# Patient Record
Sex: FEMALE | URBAN - METROPOLITAN AREA
[De-identification: names, ages, dates, MRNs, and addresses within clinical notes are randomized per-mention and may not be internally consistent; named-entity substitution may affect disease eponyms.]

---

## 2017-07-05 ENCOUNTER — TRANSFERRED RECORDS (OUTPATIENT)
Dept: HEALTH INFORMATION MANAGEMENT | Facility: CLINIC | Age: 72
End: 2017-07-05

## 2017-07-24 ASSESSMENT — ENCOUNTER SYMPTOMS
HEARTBURN: 0
MUSCLE CRAMPS: 0
HYPERTENSION: 0
EYE IRRITATION: 0
LEG PAIN: 0
HOARSE VOICE: 0
TROUBLE SWALLOWING: 1
EXERCISE INTOLERANCE: 1
DIARRHEA: 0
SWOLLEN GLANDS: 0
TACHYCARDIA: 0
NECK MASS: 0
RECTAL PAIN: 0
PALPITATIONS: 0
CONSTIPATION: 0
NECK PAIN: 0
TASTE DISTURBANCE: 1
SINUS PAIN: 0
SMELL DISTURBANCE: 0
ARTHRALGIAS: 0
VOMITING: 0
BACK PAIN: 0
MYALGIAS: 0
EYE PAIN: 0
DECREASED APPETITE: 0
NIGHT SWEATS: 1
LEG SWELLING: 0
SYNCOPE: 0
ALTERED TEMPERATURE REGULATION: 1
BOWEL INCONTINENCE: 1
SINUS CONGESTION: 0
JOINT SWELLING: 0
BRUISES/BLEEDS EASILY: 0
STIFFNESS: 0
ORTHOPNEA: 0
SORE THROAT: 0
POLYPHAGIA: 0
WEIGHT LOSS: 1
FEVER: 0
NAUSEA: 0
WEIGHT GAIN: 0
MUSCLE WEAKNESS: 1
EYE WATERING: 0
HYPOTENSION: 0
LIGHT-HEADEDNESS: 0
DOUBLE VISION: 0
FATIGUE: 1
EYE REDNESS: 0
BLOATING: 0
ABDOMINAL PAIN: 0
SLEEP DISTURBANCES DUE TO BREATHING: 0
HALLUCINATIONS: 0
JAUNDICE: 0
CLAUDICATION: 0
INCREASED ENERGY: 0
CHILLS: 0
POLYDIPSIA: 0

## 2017-08-07 ENCOUNTER — RESEARCH ENCOUNTER (OUTPATIENT)
Dept: NEUROLOGY | Facility: CLINIC | Age: 72
End: 2017-08-07

## 2017-08-07 ENCOUNTER — OFFICE VISIT (OUTPATIENT)
Dept: NEUROLOGY | Facility: CLINIC | Age: 72
End: 2017-08-07

## 2017-08-07 ENCOUNTER — THERAPY VISIT (OUTPATIENT)
Dept: PHYSICAL THERAPY | Facility: CLINIC | Age: 72
End: 2017-08-07

## 2017-08-07 VITALS — HEART RATE: 80 BPM | DIASTOLIC BLOOD PRESSURE: 61 MMHG | HEIGHT: 68 IN | SYSTOLIC BLOOD PRESSURE: 154 MMHG

## 2017-08-07 DIAGNOSIS — G71.00 MUSCULAR DYSTROPHY (H): Primary | ICD-10-CM

## 2017-08-07 DIAGNOSIS — G71.3 MITOCHONDRIAL MYOPATHY: Primary | ICD-10-CM

## 2017-08-07 DIAGNOSIS — Z74.09 IMPAIRED MOBILITY AND ADLS: Primary | ICD-10-CM

## 2017-08-07 DIAGNOSIS — G71.00 MUSCULAR DYSTROPHY (H): ICD-10-CM

## 2017-08-07 DIAGNOSIS — M34.9 SYSTEMIC SCLEROSIS (H): ICD-10-CM

## 2017-08-07 DIAGNOSIS — Z78.9 IMPAIRED MOBILITY AND ADLS: Primary | ICD-10-CM

## 2017-08-07 DIAGNOSIS — G71.3 MITOCHONDRIAL MYOPATHY: ICD-10-CM

## 2017-08-07 DIAGNOSIS — R94.2 DIFFUSION CAPACITY OF LUNG (DL), DECREASED: ICD-10-CM

## 2017-08-07 DIAGNOSIS — K74.3 PRIMARY BILIARY CIRRHOSIS (H): ICD-10-CM

## 2017-08-07 ASSESSMENT — PAIN SCALES - GENERAL: PAINLEVEL: NO PAIN (0)

## 2017-08-07 NOTE — MR AVS SNAPSHOT
After Visit Summary   8/7/2017    Ayla Mayers    MRN: [id number]           Patient Information     Date Of Birth          1945        Visit Information        Provider Department      8/7/2017 1:00 PM Mandi Guzman PT [facility]        Care Instructions    Mitochondral Myopathy Exercise Guidelines    Muscle fibers may break down in an attempt to meet the energy requirements of exercise. Exercise intolerance is common.     Light aerobic activity can be beneficial for managing symptoms, including fatigue, mood and improve your overall health.    **Do not engage in aerobic exercise that causes hear rate to exceed 65% of HR max. (HR max= 220- age)     At age 72, you should stay below 137 bpm with activity    Light resistance exercise can help build muscle mass, 5-10 lb at most    **Warning signs to stop exerice: pain, muscle tightness or cramping, exhaustion, or dark (coca-cola colored) urine. Seek medical attention if darkened urine is noticed.    Follow any cardiac precautions outlined by your cardiologist, as applicable.     Walking, biking, pool activity (swimming, water walking) are likely best tolerated.     Aim to exercise ~3 times/week at low intensity. If noticing adverse symptoms: stop, but you recover well, after 10-15 minutes you could continue activity at a lower level.            Follow-ups after your visit        Future tests that were ordered for you today     Open Future Orders        Priority Expected Expires Ordered    FSHD: Laboratory Miscellaneous Order Routine  8/7/2018 8/7/2017    PHYSICAL THERAPY REFERRAL Routine 8/11/2017 8/7/2018 8/7/2017    PFT General Lab Testing Routine 8/7/2017 8/4/2018 8/4/2017            Who to contact     Please call your clinic at [phone] to:    Ask questions about your health    Make or cancel appointments    Discuss your medicines    Learn about your test results    Speak to your doctor   If you have  compliments or concerns about an experience at your clinic, or if you wish to file a complaint, please contact [facility] Patient Relations at [phone] or email us at [email or web address]         Additional Information About Your Visit        MyChart Information     [employer] gives you secure access to your electronic health record. If you see a primary care provider, you can also send messages to your care team and make appointments. If you have questions, please call your primary care clinic.  If you do not have a primary care provider, please call [phone] and they will assist you.      [employer] is an electronic gateway that provides easy, online access to your medical records. With [employer], you can request a clinic appointment, read your test results, renew a prescription or communicate with your care team.     To access your existing account, please contact your [facility] or call [phone] for assistance.        Care EveryWhere ID     This is your Care EveryWhere ID. This could be used by other organizations to access your [place] medical records  [id number]         Blood Pressure from Last 3 Encounters:   08/07/17 154/61   04/14/16 (!) 158/92   11/09/15 128/53    Weight from Last 3 Encounters:   04/14/16 56.2 kg (124 lb)   11/09/15 58.1 kg (128 lb)   11/24/14 60.3 kg (133 lb)              Today, you had the following     No orders found for display       Primary Care Provider Office Phone # Fax #    Jennifer Spear [phone]       [facility] [address] [place] [address]        Equal Access to Services     [place] : Hadii trish rea Sosavanna, waaxda luqadaha, qaybta kaalmada judit carvajal. So [facility] [phone].    ATENCIÓN: Si habla español, tiene a thomas disposición servicios gratuitos de asistencia lingüística. Llame al  [phone].    We comply with applicable federal civil rights laws and [place] laws. We do not discriminate on the basis of race, color, national origin, age, disability sex, sexual orientation or gender identity.            Thank you!     Thank you for choosing [facility]  for your care. Our goal is always to provide you with excellent care. Hearing back from our patients is one way we can continue to improve our services. Please take a few minutes to complete the written survey that you may receive in the mail after your visit with us. Thank you!             Your Updated Medication List - Protect others around you: Learn how to safely use, store and throw away your medicines at www.disposemymeds.org.          This list is accurate as of: 8/7/17  1:28 PM.  Always use your most recent med list.                   Brand Name Dispense Instructions for use Diagnosis    cholecalciferol 2000 UNITS tablet      Take 1 tablet by mouth daily        IRON SUPPLEMENT PO      Take 325 mg by mouth        levothyroxine 100 MCG tablet    SYNTHROID/LEVOTHROID     Take 1 tablet by mouth daily 100 mcg every other day, 112 the other days        OMEGA-3 FISH OIL PO      Take 1 g by mouth        OMEPRAZOLE PO      Take 20 mg by mouth every morning        RECLAST 5 MG/100ML Soln infusion   Generic drug:  zoledronic Acid      Inject 5 mg into the vein yearly    Muscular dystrophy (H), Mitochondrial myopathy       RESTASIS 0.05 % ophthalmic emulsion   Generic drug:  cycloSPORINE      1 drop 2 times daily.        spironolactone 25 MG tablet    ALDACTONE     25 mg daily        ursodiol 300 MG capsule    ACTIGALL     300 mg 3 times daily           negative No oral lesions; no gross abnormalities

## 2017-08-07 NOTE — PATIENT INSTRUCTIONS
If you have questions or concerns following today's appointment, please contact   Yolanda Mahmood at [phone].    For more urgent concerns, contact the neurology department triage line at [phone] option 3.    Follow up with Dr. Khan in 1 year.  Meet with our genetic counselor, Michelle Welsh, today.  PT today.     We now have a  available to help patients with psychosocial needs, supportive counseling, advanced care planning, and insurance and/or disability questions. You can reach BANDAR Garcia, LICSW at [phone].

## 2017-08-07 NOTE — LETTER
"8/7/2017       RE: Ayla Mayers  [address]  [place] [address]     Dear Colleague,    Thank you for referring your patient, Ayla Mayers, to the [facility] at [facility]. Please see a copy of my visit note below.    Neurology progress note    Subjective:  Ayla Mayers is a 72 year old female with a history of CREST syndrome, Sjogrens,  Primary biliary cirrhosis, and biopsy proven mitochondrial myopathy who presents for 1.5 year follow up of her left sided facial weakness and further evaluation as well as genetic testing. The patient has a complicated history. She reports that her symptoms that she attributes to her mitochondrial myopathy are stable, and that it is primarily her CREST symptoms that are currently causing her distress-specifically a recent diagnosis of primary biliary cirrhosis. Overall she hasn't noticed a significant deterioration of her strength since her last visit here. She reports that she is able to walk up to a mile \"easily\" before the onset of fatigue, or climb 1 flight of stairs/descend 1-2 flights. The patient has some difficulty rising from a chair, but as long as it has armrests on which she can brace herself it is rarely an issue. She denies any falls or difficulties with her balance. She denies difficulty swallowing solids and liquids as long as she is careful. She denies acute vision changes and recently had her strabismus surgically corrected.  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  Due to other active medical issues, she did not follow on testing for FSH dystrophy as we had previously requested, but she is amenable to having " "it performed at this time. She understands the potential implications of this testing for her family (2 adult sons).   Of note, she reports having an episode of \"heavy\" midline chest pain approximately 4 months ago, for which she was worked up in an outside hospital's ER for concerns of an acute coronary event or PE, both of which were negative. The pain resolved after ~1 month and was attributed to costochondritis. She had stress echocardiogram and EKG in 4/2017 which were both unremarkable.   Answers for HPI/ROS submitted by the patient on 7/24/2017     Past Medical History:  CREST syndrome  Sjogrens  Primary biliary cholangitis    Family History:   History reviewed. No pertinent family history.    Social History:   Social History     Social History     Marital status:      Spouse name: N/A     Number of children: N/A     Years of education: N/A     Occupational History     Not on file.     Social History Main Topics     Smoking status: Never Smoker     Smokeless tobacco: Never Used     Alcohol use Not on file     Drug use: Not on file     Sexual activity: Not on file     Other Topics Concern     Not on file     Social History Narrative     Objective:    Vitals: /61  Pulse 80  Ht 1.715 m (5' 7.5\")    GENERAL: Awake, alert, no apparent distress, oriented to person, place, and time.   MUSC: Kyphotic spine with atrophy of lumbar muscles    Neurological exam:  MS: Awake, Oriented to person/time/place. Fully alert with good concentration and cooperation.  LANGUAGE/SPEECH: No aphasia or dysarthria. Tone is normal. Able to repeat different phonic testings. Comprehension, calculation, naming, and repetition are all within normal limits. Normal affect.   CN:   III/IV/VI: Profound horizontal and vertical EOM restriction bilaterally, L>R. Left eye deviated inferolaterally when focusing right eye straight ahead. R pupil slightly sluggish but responsive to light.  V:  Absence of nasolabial fold and normal " markings of age on left, present on right.   VII: Facial muscles markedly asymmetric, with ptosis and significant atrophy and smoothing of the skin on the left.   VIII: Hearing intact to conversational voice.  IX/X: Palatal raise symmetric but weak  XI: Shoulder shrug weak but able to resist mild force bilaterally.  XII: Tongue midline; able to move it left and right without difficulties.  MOTOR:  Atrophy/Bulk: Significant muscle wasting of left facial muscles, and all four extremities  Fasciculation/Tremor: No fasciculation, tremor, or twitches.  Tone: Normal tone. Atrophy throughout all 4 extremities.    Strength:   4/5 neck extension, 2/5  Neck and trunk forward flexion,  strength 5/5 on right and 4/5 on left. Mild scapular winging on right. Positive Beevor sign.     Upper extremity   DELTOID  TRICEPS  BICEPS  WRIST   EXTENSION  FINGER   EXTENSION  FDI   RIGHT  4- 4 4 4 4- 4   LEFT  4- 3 4 4 4- 4     Lower extremity   HIP   FLEXION  KNEE   FLEXION  KNEE   EXTENSION  DORSIFLEXION   RIGHT   3- 5 5 4   LEFT  3 5 5 4       DEEP TENDON REFLEXES:    BICEPS  TRICEPS  BR  PATELLA  ACHILLES    RIGHT  +2  +2  +2  +2  +2    LEFT  +2  +2  +2  +2  +2    SENSORY:  Intact to light touch and pin prick throughout all four extremities  COORDINATION:  No dysdiadochokinesia   Finger tapping is normal  MOVEMENT: No resting or postural tremor  GAIT: Normal gait including walking on heels, toes, and tandem gait. Normal stride and no arm swing.  Romberg negative    Assessment & Plan:     1. Mitochondrial myopathy, biopsy proven 2. Rule out FSH muscular dystrophy  Patient returns for follow up with above issues. She should continue following with Rheumatology and Pulmonology, as well as Hepatology, for CREST, reduced DLCO and PBC respectively. We discussed treating her mitochondrial myopathy with a cocktail of nutrients including CoQ10, carnitine, creatine, lipoic acid, vitamin B complex, etc. I told her I do not have a very strong  opinion about it, as the data regarding its efficacy are mixed (and rather weak evidence). The patient did not appear very excited about those, for reasons including out-of-pocket cost, and we will defer those for now. She had recent PFTs, and cardiology evaluation, including EKG and echo, none of which demonstrated any cardiac or lung muscle involvement related to the mitochondrial disorder. She will be seen by our Physical therapist today, and [employer] representative. The asymmetric involvement of her facial muscles, the prominent lordosis, axial weakness and foot dorsiflexion weakness raise some suspicion for FSHD. This pattern of weakness is not entirely typical for MM. We will offer testing for the 4q35 deletion today; patient agrees. Follow up in 1 year or earlier if necessary.   Soy Keen  MS3    ATTENDING ADDENDUM: Patient seen and examined with MS3 Soy Keen who was acting as scribe on my behalf. Agree with the assessment and plan as above which I personally edited to reflect my own impression and findings.TT spent for patient care 25 minutes; more than half was counseling.    Suresh Khan MD   of Neurology

## 2017-08-07 NOTE — PROGRESS NOTES
"    OUTPATIENT PHYSICAL THERAPY CLINIC NOTE  Ayla Mayers  YOB: 1945  [id number]    Type of visit:         Evaluation     Date of service: 8/7/2017    Referring provider: Dr Khan    Others present at visit:  Spouse / significant other, Rickey    Medical diagnosis:   Muscular dystrophy (MD) - Mitochondrial myopathy    Date of diagnosis: 3/11/2005    Pertinent history of current problem (include personal factors and/or comorbidities that impact the plan of care): Gradually progressive weakness, proximal>distal. H/o Systemic sclerosis, Sjogrens, Sicca syndrome, L ptosis, Raynauds, liver cirrhosis with ascites, scleroderma, hypothyroidism.    Cardio-respiratory status:  Forced vital capacity: NT today (last 94% in 2015)     Height/Weight: 5'7\" / NT    Living environment:  Condo     Living environment barriers:   1 level once inside  15 stairs(1 railing present) - to underground parking. Also has elevator.      Current assistance/living environment:  Lives with  (Rickey)  Indep with IADLs  Not walking as far as I'd like to - limited by leg fatigue - if level can do 1-1.5miles, ~30 mins      Current mobility equipment:  None      Current ADL equipment:  Toilet safety frame  Walk in shower     Technology used: Computer and cell- ok    Patient concerns/goals: Elevator to ascend, stairs to go down with rail. No falls. No specific concerns. Notes she was really good about doing exercises from [facility] for a while, but then got out of routine, requesting review/progression as appropriate.     Evaluation   Interview completed.   Pain assessment: Pain denied   Range of motion: Shoulder AROM R 105*, L 115*    Manual muscle testing: Shoulder flexion 4-/5 R, 4+/5 L, abd 4-/5 R, 4/5 L, bicep 4+/5 R, 5/5 L, tricep 4-/5 B, wrist ext 4/5 R, 4+/5 L, wrist flex 4+/5 B,  reduced R, WNL L. Pt notes she's R hand dominant but does more things functionally with her L UE. Hip flexion 2/5 B, hip abd 4+/5 B, hip " add 5/5 B, knee ext/flex 5/5, ankle DF 5/5 B. Pt needed strong use of her UEs for sit>stand from standard height chair.    Gait: Pt amb without AD with shortened step length, adequate foot clearance, kyphotic and sway back posture noted.    Cognition: Intact    Fall Risk Screen:   Has the patient fallen 2 or more times in the last year? No      Has the patient fallen and had an injury in the past year? No       Timed Up and Go Score: 8.0 sec     25ft walk: 6.84 sec, 12 steps    Is the patient a fall risk? Yes, due to proximal weakness, department fall risk interventions implemented     Impairments:  Fatigue  Muscle atrophy  Balance  Range of motion     Treatment diagnosis:  Impaired mobility  Impaired activities of daily living    Clinical Presentation: Stable/Uncomplicated  Clinical Presentation Rationale: Very gradual progression of weakness, overall stable course  Clinical Decision Making (Complexity): Low complexity     Recommendations/Plan of care:  1 session evaluation & treatment.     Goals:   Target date: 8/7/17  Patient, family and/or caregiver will verbalize understanding of evaluation results and implications for functional performance.  Patient, family and/or caregiver will verbalize/demonstrate understanding of home program.    Educational assessment/barriers to learning:   No barriers noted     Treatment provided this date:   Therapeutic exercise, 20 minutes  -Reviewed technique and rationale for exercises provided by Sheryl in the past and potential additions to program to promote posture:  -Scapular retraction and chin tuck, performed in supine, 5 sec hold x5 reps, gradual progression toward 10 reps.   -Chest stretch, lying supine, hands behind head, let elbows open out and stretch front of shoulders/chest, hold 30-60 sec, 2-3 reps  -Posture at wall- stand with hips and shoulders touching wall, bring head back over body (might not quite touch), keep note/eyes level vs tipping. Bring elements from scap  squeeze and chin tuck to upright posture. Hold 10 sec x3 reps, gradually increase time toward 30 sec as tolerated  -Provided general guidelines for aerobic exercise in setting of Mitochondrial Myopathy. Discussed benefits of regular activity, but need for caution to avoid over-exertion which might lead to muscle breakdown. Provided guideline for working under 65% of HR max. Discussed ways to monitor HR, or use perceived exertion as guide. Listen to signs of fatigue and rest, if recovered can return to exercise at lighter workload. Pt voices understanding.     Response to treatment/recommendations: Voices understanding    Goal attainment:  All goals met     Risks and benefits of evaluation/treatment have been explained.  Patient, family and/or caregiver are in agreement with Plan of Care.     Evaluation time: 8  Treatment time: 20  Total contact time: 28    Signature:   Danica Guzman, PT, DPT  Physical Therapist  [facility] [address]  [place] [address]  [email or web address]  Office: [phone]  Pager: [phone]  Fax: [phone]   Date: 8/7/2017    Medicare G-code:  Mobility: Walking and Moving Around  Current Status , Goal , Discharge   1 session only, modifier the same for all G-codes.  CI: 1-19% impairment  Modifier determined by clinical judgment in conjunction with objective data and subjective report.        Certification: BCBS [place], Medicare  Onset date: 8/7/2017  Start of care date: 8/7/2017  Certification date from 8/7/2017 to 8/7/2017    I CERTIFY THE NEED FOR THESE SERVICES FURNISHED UNDER        THIS PLAN OF TREATMENT AND WHILE UNDER MY CARE     (Physician attestation of this document indicates review and certification of the therapy plan).

## 2017-08-07 NOTE — MR AVS SNAPSHOT
After Visit Summary   8/7/2017    Ayla Mayers    MRN: [id number]           Patient Information     Date Of Birth          1945        Visit Information        Provider Department      8/7/2017 11:50 AM Suresh Khan MD [facility] Neurology        Today's Diagnoses     Muscular dystrophy (H)    -  1      Care Instructions    If you have questions or concerns following today's appointment, please contact   Yolanda Mahmood at [phone].    For more urgent concerns, contact the neurology department triage line at [phone] option 3.    Follow up with Dr. Khan in 1 year.  Meet with our genetic counselor, Michelle Welsh, today.  PT today.     We now have a  available to help patients with psychosocial needs, supportive counseling, advanced care planning, and insurance and/or disability questions. You can reach BANDAR Garcia, [facility] at [phone].              Follow-ups after your visit        Additional Services     PHYSICAL THERAPY REFERRAL       PT Clinician to Evaluate and treat patient in [facility].                  Your next 10 appointments already scheduled     Aug 07, 2017  2:00 PM CDT   LAB with [facility] ([facility])    [address] [place] [address] [phone]           Patient must bring picture ID. Patient should be prepared to give a urine specimen  Please do not eat 10-12 hours before your appointment if you are coming in fasting for labs on lipids, cholesterol, or glucose (sugar). Pregnant women should follow their Care Team instructions. Water with medications is okay. Do not drink coffee or other fluids. If you have concerns about taking  your medications, please ask at office or if scheduling via [employer], send a message by clicking on Secure Messaging, Message Your Care Team.            Aug 06, 2018 11:50 AM CDT   (Arrive by 11:35 AM)   Return Muscular Dystrophy  with Suresh Khan MD   [facility] Neurology ([facility])    [address]  [address] [facility] [address] [phone]              Future tests that were ordered for you today     Open Future Orders        Priority Expected Expires Ordered    FSHD: Laboratory Miscellaneous Order Routine  8/7/2018 8/7/2017    PHYSICAL THERAPY REFERRAL Routine 8/11/2017 8/7/2018 8/7/2017    PFT General Lab Testing Routine 8/7/2017 8/4/2018 8/4/2017            Who to contact     Please call your clinic at [phone] to:    Ask questions about your health    Make or cancel appointments    Discuss your medicines    Learn about your test results    Speak to your doctor   If you have compliments or concerns about an experience at your clinic, or if you wish to file a complaint, please contact [facility] Patient Relations at [phone] or email us at [email or web address]         Additional Information About Your Visit        [employer] Information     [employer] gives you secure access to your electronic health record. If you see a primary care provider, you can also send messages to your care team and make appointments. If you have questions, please call your primary care clinic.  If you do not have a primary care provider, please call [phone] and they will assist you.      [employer] is an electronic gateway that provides easy, online access to your medical records. With [employer], you can request a clinic appointment, read your test results, renew a prescription or communicate with your care team.     To access your existing account, please contact your [facility] or call [phone] for assistance.        Care EveryWhere ID     This is your Care EveryWhere ID. This could be used by other organizations to access your [place] medical records  [id number]        Your Vitals Were     Pulse Height                80  "1.715 m (5' 7.5\")           Blood Pressure from Last 3 Encounters:   08/07/17 154/61   04/14/16 (!) 158/92   11/09/15 128/53    Weight from Last 3 Encounters:   04/14/16 56.2 kg (124 lb)   11/09/15 58.1 kg (128 lb)   11/24/14 60.3 kg (133 lb)               Primary Care Provider Office Phone # Fax #    Jennifer Spear [phone]       [facility] [address] [place] [address]        Equal Access to Services     [facility]: Hadii trish chapman hadasho Soomaali, waaxda luqadaha, qaybta kaalmada adeegyada, judit vasquez . So [facility] [phone].    ATENCIÓN: Si habla español, tiene a thomas disposición servicios gratuitos de asistencia lingüística. [facility] [phone].    We comply with applicable federal civil rights laws and [place] laws. We do not discriminate on the basis of race, color, national origin, age, disability sex, sexual orientation or gender identity.            Thank you!     Thank you for choosing [facility] NEUROLOGY  for your care. Our goal is always to provide you with excellent care. Hearing back from our patients is one way we can continue to improve our services. Please take a few minutes to complete the written survey that you may receive in the mail after your visit with us. Thank you!             Your Updated Medication List - Protect others around you: Learn how to safely use, store and throw away your medicines at www.disposemymeds.org.          This list is accurate as of: 8/7/17  1:35 PM.  Always use your most recent med list.                   Brand Name Dispense Instructions for use Diagnosis    cholecalciferol 2000 UNITS tablet      Take 1 tablet by mouth daily        IRON SUPPLEMENT PO      Take 325 mg by mouth        levothyroxine 100 MCG tablet    SYNTHROID/LEVOTHROID     Take 1 tablet by mouth daily 100 mcg every other day, 112 the other days        OMEGA-3 FISH OIL PO      Take 1 g by mouth        OMEPRAZOLE PO      " Take 20 mg by mouth every morning        RECLAST 5 MG/100ML Soln infusion   Generic drug:  zoledronic Acid      Inject 5 mg into the vein yearly    Muscular dystrophy (H), Mitochondrial myopathy       RESTASIS 0.05 % ophthalmic emulsion   Generic drug:  cycloSPORINE      1 drop 2 times daily.        spironolactone 25 MG tablet    ALDACTONE     25 mg daily        ursodiol 300 MG capsule    ACTIGALL     300 mg 3 times daily

## 2017-08-07 NOTE — PROGRESS NOTES
[employer] GENETIC COUNSELING CONSULT:  Ayla was seen [facility] for a genetic counseling consult at the request of  to discuss genetic testing options for facioscapulohumeral muscular dystrophy (FSHD). Ayla has progressive muscle weakness of an unknown etiology.    FAMILY HISTORY  A detailed family history was taken and scanned into Ayla s medical record.  Her family history is significant for the followin brother and 1 sister who are healthy    2  sons who are also healthy    There is no known family history of neuromuscular disease or genetic condition.    Ethnic background is [place], [place] and Irish.      GENETIC COUNSELIN. Facioscapulohumeral muscular dystrophy (FSHD) is characterized by muscle weakness in the facial, scapular and upper arm muscles and subsequent development of weakness in other muscles of the lower legs and torso.  The clinical symptoms of FSHD are highly variable, both between individuals and within families.  Symptoms typically present in the twenties, however the age of onset is also highly variable.  Ayla has the following symptoms  asymmetric involvement of her facial muscles.    2. FSHD is an autosomal dominant condition.  This means that an individual affected with FSHD has a 1 out of 2 (50%) chance, in each pregnancy; to pass the allele associated with FSHD on to their offspring. It is estimated that 10-30% of individuals with FSHD are a result a new mutation and have no family history of FSHD.     3. We discussed genetic testing for FSHD.  The gene or genes that are associated with FSHD have not yet been identified.  A deletion within chromosome 4 has been found to be associated with FSHD.  This deletion is found in 90- 95% of the individuals that have FSHD.  If negative, we will follow-up with probe A/B testing that detects some mutations missed by other methods.  The is a second gene SMCHD1 associated with FSHD that can be  evaluated depending on the results.     4. Due to the heterogeneous nature of this condition and the complexity of her condition there is a reasonable probability of detecting a genetic mutation using this test. Genetic confirmation has a significant likelihood of providing my patient and his/her family with an accurate diagnosis, thereby ensuring appropriate medical management. In many cases, a diagnosis can lead to a specific treatment or management strategy that dramatically changes the clinical outcome. A diagnosis can also help identify necessary medical referrals, screening for associated complications, and recurrence risk counseling. Once a diagnosis is made, the costs associated with further testing are likely to decrease.    5. All immediate questions were answered.  My contact information was provided for future questions.  Twenty minutes was spent with the patient and more than 50% of the time was spent in counseling and coordination of care.     PLAN:   1. Ayla had her blood drawn on August 7, 2017 for genetic testing for FSHD  to be performed at the [facility].  2. Ayla will be contacted with results.    Michelle Welsh MS  Certified Genetic Counselor  Phone: [phone]

## 2017-08-07 NOTE — PATIENT INSTRUCTIONS
Mitochondral Myopathy Exercise Guidelines    Muscle fibers may break down in an attempt to meet the energy requirements of exercise. Exercise intolerance is common.     Light aerobic activity can be beneficial for managing symptoms, including fatigue, mood and improve your overall health.    **Do not engage in aerobic exercise that causes hear rate to exceed 65% of HR max. (HR max= 220- age)     At age 72, you should stay below 137 bpm with activity    Light resistance exercise can help build muscle mass, 5-10 lb at most    **Warning signs to stop exerice: pain, muscle tightness or cramping, exhaustion, or dark (coca-cola colored) urine. Seek medical attention if darkened urine is noticed.    Follow any cardiac precautions outlined by your cardiologist, as applicable.     Walking, biking, pool activity (swimming, water walking) are likely best tolerated.     Aim to exercise ~3 times/week at low intensity. If noticing adverse symptoms: stop, but you recover well, after 10-15 minutes you could continue activity at a lower level.

## 2017-08-07 NOTE — PROGRESS NOTES
[facility] Neuromuscular Registry    IRB # [id number]  PI: Enoch Suresh MD, PhD  : Miroslava Potts    Patient was approached for possible participation for the above study. The current approved IRB consent form was discussed and explained to the patient.  It was discussed that involvement with the study is voluntary and refusal to participate would not involve penalty or decrease benefits at which the patient is entitled, and the subject may discontinue his/her involvement at any time without penalty or loss in benefits. We also discussed that this study does not have follow up visits or procedures. Patient was informed that an additional contact might occur if data needed was not found in patient s medical record. The patient was given time to review and ask any questions about the consent. Patient was shown contact information for PI and study staff in consent for future questions. Patient verbalized understanding of consent and study by restating the purpose, procedures, duration, risk, confidentiality of PHI, and voluntarily participation. Patient printed, signed and dated the consent and HIPAA form prior to study involvement. A copy was given to the patient for their records.     Subject Consent/HIPAA : SIGNED ON 8.7.2017

## 2017-08-07 NOTE — LETTER
2017       RE: Ayla Mayers  [address]  [place] [address]     Dear Colleague,    Thank you for referring your patient, Ayla Mayers, to the [facility] NEUROLOGY at [facility]. Please see a copy of my visit note below.    [facility] GENETIC COUNSELING CONSULT:  Ayla was seen [facility] for a genetic counseling consult at the request of  to discuss genetic testing options for facioscapulohumeral muscular dystrophy (FSHD). Ayla has progressive muscle weakness of an unknown etiology.    FAMILY HISTORY  A detailed family history was taken and scanned into Ayla s medical record.  Her family history is significant for the followin brother and 1 sister who are healthy    2  sons who are also healthy    There is no known family history of neuromuscular disease or genetic condition.    Ethnic background is Telugu, Urdu [place] and [place].      GENETIC COUNSELIN. Facioscapulohumeral muscular dystrophy (FSHD) is characterized by muscle weakness in the facial, scapular and upper arm muscles and subsequent development of weakness in other muscles of the lower legs and torso.  The clinical symptoms of FSHD are highly variable, both between individuals and within families.  Symptoms typically present in the twenties, however the age of onset is also highly variable.  Ayla has the following symptoms  asymmetric involvement of her facial muscles.    2. FSHD is an autosomal dominant condition.  This means that an individual affected with FSHD has a 1 out of 2 (50%) chance, in each pregnancy; to pass the allele associated with FSHD on to their offspring. It is estimated that 10-30% of individuals with FSHD are a result a new mutation and have no family history of FSHD.     3. We discussed genetic testing for FSHD.  The gene or genes that are associated with FSHD have not yet been identified.  A deletion within  chromosome 4 has been found to be associated with FSHD.  This deletion is found in 90- 95% of the individuals that have FSHD.  If negative, we will follow-up with probe A/B testing that detects some mutations missed by other methods.  The is a second gene SMCHD1 associated with FSHD that can be evaluated depending on the results.     4. Due to the heterogeneous nature of this condition and the complexity of her condition there is a reasonable probability of detecting a genetic mutation using this test. Genetic confirmation has a significant likelihood of providing my patient and his/her family with an accurate diagnosis, thereby ensuring appropriate medical management. In many cases, a diagnosis can lead to a specific treatment or management strategy that dramatically changes the clinical outcome. A diagnosis can also help identify necessary medical referrals, screening for associated complications, and recurrence risk counseling. Once a diagnosis is made, the costs associated with further testing are likely to decrease.    5. All immediate questions were answered.  My contact information was provided for future questions.  Twenty minutes was spent with the patient and more than 50% of the time was spent in counseling and coordination of care.     PLAN:   1. Ayla had her blood drawn on August 7, 2017 for genetic testing for FSHD  to be performed at the [facility].  2. Ayla will be contacted with results.    Michelle Welsh MS  Certified Genetic Counselor  Phone: [phone]

## 2017-08-07 NOTE — PROGRESS NOTES
"Neurology progress note    Subjective:  Ayla Mayers is a 72 year old female with a history of CREST syndrome, Sjogrens,  Primary biliary cirrhosis, and biopsy proven mitochondrial myopathy who presents for 1.5 year follow up of her left sided facial weakness and further evaluation as well as genetic testing. The patient has a complicated history. She reports that her symptoms that she attributes to her mitochondrial myopathy are stable, and that it is primarily her CREST symptoms that are currently causing her distress-specifically a recent diagnosis of primary biliary cirrhosis. Overall she hasn't noticed a significant deterioration of her strength since her last visit here. She reports that she is able to walk up to a mile \"easily\" before the onset of fatigue, or climb 1 flight of stairs/descend 1-2 flights. The patient has some difficulty rising from a chair, but as long as it has armrests on which she can brace herself it is rarely an issue. She denies any falls or difficulties with her balance. She denies difficulty swallowing solids and liquids as long as she is careful. She denies acute vision changes and recently had her strabismus surgically corrected.  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  Due to other active medical issues, she did not follow on testing for FSH dystrophy as we had previously requested, but she is amenable to having it performed at this time. She understands the potential implications of this testing for her family (2 adult sons).   Of note, she reports having an episode of \"heavy\" midline chest pain approximately 4 months ago, for which she was worked up in an outside hospital's ER for concerns of an acute " coronary event or PE, both of which were negative. The pain resolved after ~1 month and was attributed to costochondritis. She had stress echocardiogram and EKG in 4/2017 which were both unremarkable.     Answers for HPI/ROS submitted by the patient on 7/24/2017   General Symptoms: Yes  Skin Symptoms: No  HENT Symptoms: Yes  EYE SYMPTOMS: Yes  HEART SYMPTOMS: Yes  LUNG SYMPTOMS: No  INTESTINAL SYMPTOMS: Yes  URINARY SYMPTOMS: No  GYNECOLOGIC SYMPTOMS: No  BREAST SYMPTOMS: No  SKELETAL SYMPTOMS: Yes  BLOOD SYMPTOMS: Yes  NERVOUS SYSTEM SYMPTOMS: No  MENTAL HEALTH SYMPTOMS: No  Fever: No  Loss of appetite: No  Weight loss: Yes  Weight gain: No  Fatigue: Yes  Night sweats: Yes  Chills: No  Increased stress: No  Excessive hunger: No  Excessive thirst: No  Feeling hot or cold when others believe the temperature is normal: Yes  Post-operative complications: No  Surgical site pain: No  Hallucinations: No  Change in or Loss of Energy: No  Hyperactivity: No  Confusion: No  Ear pain: No  Ear discharge: No  Hearing loss: No  Tinnitus: No  Nosebleeds: No  Congestion: No  Sinus pain: No  Trouble swallowing: Yes   Voice hoarseness: No  Mouth sores: No  Sore throat: No  Tooth pain: No  Gum tenderness: No  Bleeding gums: No  Change in taste: Yes  Change in sense of smell: No  Dry mouth: Yes  Hearing aid used: No  Neck lump: No  Eye pain: No  Vision loss: No  Dry eyes: Yes  Watery eyes: No  Eye bulging: No  Double vision: No  Flashing of lights: No  Spots: No  Floaters: No  Redness: No  Crossed eyes: No  Tunnel Vision: No  Yellowing of eyes: No  Eye irritation: No  Chest pain or pressure: No  Fast or irregular heartbeat: No  Pain in legs with walking: No  Swelling in feet or ankles: No  Trouble breathing while lying down: No  Fingers or Toes appear blue: No  High blood pressure: No  Low blood pressure: No  Fainting: No  Murmurs: No  Chest pain on exertion: No  Chest pain at rest: No  Cramping pain in leg during exercise:  "No  Pacemaker: No  Varicose veins: No  Fast heart beat: No  Wake up at night with shortness of breath: No  Heart flutters: No  Light-headedness: No  Exercise intolerance: Yes  Heart burn or indigestion: No  Nausea: No  Vomiting: No  Abdominal pain: No  Bloating: No  Constipation: No  Diarrhea: No  Black stools: No  Rectal or Anal pain: No  Fecal incontinence: Yes  Yellowing of skin or eyes: No  Vomit with blood: No  Change in stools: No  Hemorrhoids: No  Back pain: No  Muscle aches: No  Neck pain: No  Swollen joints: No  Joint pain: No  Bone pain: No  Muscle cramps: No  Muscle weakness: Yes  Joint stiffness: No  Bone fracture: No  Anemia: Yes  Swollen glands: No  Easy bleeding or bruising: No    Past Medical History:  CREST syndrome  Sjogrens  Primary biliary cholangitis      Family History:   History reviewed. No pertinent family history.    Social History:   Social History     Social History     Marital status:      Spouse name: N/A     Number of children: N/A     Years of education: N/A     Occupational History     Not on file.     Social History Main Topics     Smoking status: Never Smoker     Smokeless tobacco: Never Used     Alcohol use Not on file     Drug use: Not on file     Sexual activity: Not on file     Other Topics Concern     Not on file     Social History Narrative         Objective:    Vitals: /61  Pulse 80  Ht 1.715 m (5' 7.5\")    GENERAL: Awake, alert, no apparent distress, oriented to person, place, and time.   MUSC: Kyphotic spine with atrophy of lumbar muscles    Neurological exam:  MS: Awake, Oriented to person/time/place. Fully alert with good concentration and cooperation.  LANGUAGE/SPEECH: No aphasia or dysarthria. Tone is normal. Able to repeat different phonic testings. Comprehension, calculation, naming, and repetition are all within normal limits. Normal affect.   CN:   III/IV/VI: Profound horizontal and vertical EOM restriction bilaterally, L>R. Left eye deviated " inferolaterally when focusing right eye straight ahead. R pupil slightly sluggish but responsive to light.  V:  Absence of nasolabial fold and normal markings of age on left, present on right.   VII: Facial muscles markedly asymmetric, with ptosis and significant atrophy and smoothing of the skin on the left.   VIII: Hearing intact to conversational voice.  IX/X: Palatal raise symmetric but weak  XI: Shoulder shrug weak but able to resist mild force bilaterally.  XII: Tongue midline; able to move it left and right without difficulties.  MOTOR:  Atrophy/Bulk: Significant muscle wasting of left facial muscles, and all four extremities  Fasciculation/Tremor: No fasciculation, tremor, or twitches.  Tone: Normal tone. Atrophy throughout all 4 extremities.    Strength:   4/5 neck extension, 2/5  Neck and trunk forward flexion,  strength 5/5 on right and 4/5 on left. Mild scapular winging on right. Positive Beevor sign.     Upper extremity   DELTOID  TRICEPS  BICEPS  WRIST   EXTENSION  FINGER   EXTENSION  FDI   RIGHT  4- 4 4 4 4- 4   LEFT  4- 3 4 4 4- 4     Lower extremity   HIP   FLEXION  KNEE   FLEXION  KNEE   EXTENSION  DORSIFLEXION   RIGHT   3- 5 5 4   LEFT  3 5 5 4       DEEP TENDON REFLEXES:    BICEPS  TRICEPS  BR  PATELLA  ACHILLES    RIGHT  +2  +2  +2  +2  +2    LEFT  +2  +2  +2  +2  +2    SENSORY:  Intact to light touch and pin prick throughout all four extremities  COORDINATION:  No dysdiadochokinesia   Finger tapping is normal  MOVEMENT: No resting or postural tremor  GAIT: Normal gait including walking on heels, toes, and tandem gait. Normal stride and no arm swing.  Romberg negative    Assessment & Plan:     1. Mitochondrial myopathy, biopsy proven 2. Rule out FSH muscular dystrophy    Patient returns for follow up with above issues. She should continue following with Rheumatology and Pulmonology, as well as Hepatology, for CREST, reduced DLCO and PBC respectively. We discussed treating her mitochondrial  myopathy with a cocktail of nutrients including CoQ10, carnitine, creatine, lipoic acid, vitamin B complex, etc. I told her I do not have a very strong opinion about it, as the data regarding its efficacy are mixed (and rather weak evidence). The patient did not appear very excited about those, for reasons including out-of-pocket cost, and we will defer those for now. She had recent PFTs, and cardiology evaluation, including EKG and echo, none of which demonstrated any cardiac or lung muscle involvement related to the mitochondrial disorder. She will be seen by our Physical therapist today, and [employer] representative. The asymmetric involvement of her facial muscles, the prominent lordosis, axial weakness and foot dorsiflexion weakness raise some suspicion for FSHD. This pattern of weakness is not entirely typical for MM. We will offer testing for the 4q35 deletion today; patient agrees. Follow up in 1 year or earlier if necessary.   Soy Keen  MS3    ATTENDING ADDENDUM: Patient seen and examined with MS3 Soy Keen who was acting as scribe on my behalf. Agree with the assessment and plan as above which I personally edited to reflect my own impression and findings.TT spent for patient care 25 minutes; more than half was counseling.    Suresh Khan MD   of Neurology

## 2017-08-07 NOTE — NURSING NOTE
Chief Complaint   Patient presents with     RECHECK     MUscular Dystrophy    Avni White, DEEPALI

## 2017-08-07 NOTE — MR AVS SNAPSHOT
After Visit Summary   8/7/2017    Ayla Mayers    MRN: [id number]           Patient Information     Date Of Birth          1945        Visit Information        Provider Department      8/7/2017 11:50 AM Michelle Welsh [facility] Neurology        Today's Diagnoses     Muscular dystrophy (H)    -  1       Follow-ups after your visit        Your next 10 appointments already scheduled     Aug 06, 2018 11:50 AM CDT   (Arrive by 11:35 AM)   Return Muscular Dystrophy with Suresh Khan MD   [facility] Neurology ([facility])    [address] [place] [id number] [phone]              Future tests that were ordered for you today     Open Future Orders        Priority Expected Expires Ordered    PHYSICAL THERAPY REFERRAL Routine 8/11/2017 8/7/2018 8/7/2017    PFT General Lab Testing Routine 8/7/2017 8/4/2018 8/4/2017            Who to contact     Please call your clinic at [phone] to:    Ask questions about your health    Make or cancel appointments    Discuss your medicines    Learn about your test results    Speak to your doctor   If you have compliments or concerns about an experience at your clinic, or if you wish to file a complaint, please contact [facility] Patient Relations at [phone] or email us at [email or web address]         Additional Information About Your Visit        MyChart Information     [employer] gives you secure access to your electronic health record. If you see a primary care provider, you can also send messages to your care team and make appointments. If you have questions, please call your primary care clinic.  If you do not have a primary care provider, please call [phone] and they will assist you.      [employer] is an electronic gateway that provides easy, online access to your medical records. With [employer], you can request a clinic appointment, read your  test results, renew a prescription or communicate with your care team.     To access your existing account, please contact your [facility] or call [phone] for assistance.        Care EveryWhere ID     This is your Care EveryWhere ID. This could be used by other organizations to access your [facility] medical records  [id number]         Blood Pressure from Last 3 Encounters:   08/07/17 154/61   04/14/16 (!) 158/92   11/09/15 128/53    Weight from Last 3 Encounters:   04/14/16 56.2 kg (124 lb)   11/09/15 58.1 kg (128 lb)   11/24/14 60.3 kg (133 lb)              Today, you had the following     No orders found for display       Primary Care Provider Office Phone # Fax #    Jennifer Spear [phone]       [facility] [address] [place] [address]        Equal Access to Services     [place]SE [place] : Hadii aad ku hadasho Soomaali, waaxda luqadaha, qaybta kaalmada adeegyada, waxay idiin hayphillipn wanda vasquez . So [facility] [phone].    ATENCIÓN: Si habla español, tiene a thomas disposición servicios gratuitos de asistencia lingüística. Llame al [phone].    We comply with applicable federal civil rights laws and [place] laws. We do not discriminate on the basis of race, color, national origin, age, disability sex, sexual orientation or gender identity.            Thank you!     Thank you for choosing [facility] NEUROLOGY  for your care. Our goal is always to provide you with excellent care. Hearing back from our patients is one way we can continue to improve our services. Please take a few minutes to complete the written survey that you may receive in the mail after your visit with us. Thank you!             Your Updated Medication List - Protect others around you: Learn how to safely use, store and throw away your medicines at www.disposemymeds.org.          This list is accurate as of: 8/7/17  5:50 PM.  Always use your most recent med  list.                   Brand Name Dispense Instructions for use Diagnosis    cholecalciferol 2000 UNITS tablet      Take 1 tablet by mouth daily        IRON SUPPLEMENT PO      Take 325 mg by mouth        levothyroxine 100 MCG tablet    SYNTHROID/LEVOTHROID     Take 1 tablet by mouth daily 100 mcg every other day, 112 the other days        OMEGA-3 FISH OIL PO      Take 1 g by mouth        OMEPRAZOLE PO      Take 20 mg by mouth every morning        RECLAST 5 MG/100ML Soln infusion   Generic drug:  zoledronic Acid      Inject 5 mg into the vein yearly    Muscular dystrophy (H), Mitochondrial myopathy       RESTASIS 0.05 % ophthalmic emulsion   Generic drug:  cycloSPORINE      1 drop 2 times daily.        spironolactone 25 MG tablet    ALDACTONE     25 mg daily        ursodiol 300 MG capsule    ACTIGALL     300 mg 3 times daily

## 2017-09-19 LAB — LAB SCANNED RESULT: NORMAL

## 2017-09-20 ENCOUNTER — TELEPHONE (OUTPATIENT)
Dept: NEUROLOGY | Facility: CLINIC | Age: 72
End: 2017-09-20

## 2017-09-20 NOTE — TELEPHONE ENCOUNTER
Left message to contact me regarding results.    Michelle Welsh MS, [facility]  Genetic Counselor  Phone: [phone]

## 2017-09-20 NOTE — LETTER
September 20, 2017       TO: Ayla Mayers  [address]  [place] [address]       DearMs.Riana,    I am writing to follow-up on your genetic test results for facioscapulohumeral muscular dystrophy (FSHD), which was done because your clinical symptoms were not consistent with mitochondrial myopathy.  As you know you had testing for FSHD .  Your results were NEGATIVE, meaning you did NOT inherit the chromosome 4 deletion that causes FSHD, likewise they did not findings consistent with FSHD.  This means we still do not know the cause of your symptoms.  A copy of your results are enclosed in this letter.      It has been a pleasure being involved in your care.   If myself or the clinic staff can be helpful to you in anyway please contact us.    Sincerely,  Michelle Welsh MS, [facility]  Genetic Counselor  Phone: [phone]      Resulted Orders   Facioscapulohomeral Dystrophy (FSHD)   Result Value Ref Range    Lab Scanned Result FACIOSCAPULO DYSTROPHY-Scanned

## 2018-05-24 ENCOUNTER — TRANSFERRED RECORDS (OUTPATIENT)
Dept: HEALTH INFORMATION MANAGEMENT | Facility: CLINIC | Age: 73
End: 2018-05-24

## 2018-06-12 ENCOUNTER — TRANSFERRED RECORDS (OUTPATIENT)
Dept: HEALTH INFORMATION MANAGEMENT | Facility: CLINIC | Age: 73
End: 2018-06-12

## 2018-07-27 ENCOUNTER — TRANSFERRED RECORDS (OUTPATIENT)
Dept: HEALTH INFORMATION MANAGEMENT | Facility: CLINIC | Age: 73
End: 2018-07-27

## 2018-08-05 ASSESSMENT — ENCOUNTER SYMPTOMS
HEADACHES: 0
NIGHT SWEATS: 0
CONSTIPATION: 0
NECK PAIN: 0
VOMITING: 0
ABDOMINAL PAIN: 0
HEARTBURN: 0
BLOATING: 0
MYALGIAS: 0
NAUSEA: 0
POLYDIPSIA: 0
BACK PAIN: 0
DECREASED APPETITE: 0
MEMORY LOSS: 0
MUSCLE WEAKNESS: 1
HALLUCINATIONS: 0
DISTURBANCES IN COORDINATION: 0
RECTAL PAIN: 0
EYE IRRITATION: 0
TASTE DISTURBANCE: 0
STIFFNESS: 0
SINUS PAIN: 0
ARTHRALGIAS: 0
INCREASED ENERGY: 1
SEIZURES: 0
LOSS OF CONSCIOUSNESS: 0
SINUS CONGESTION: 0
SORE THROAT: 0
WEIGHT GAIN: 0
WEAKNESS: 1
BLOOD IN STOOL: 0
DIARRHEA: 0
HOARSE VOICE: 0
TINGLING: 0
FEVER: 0
WEIGHT LOSS: 1
EYE PAIN: 1
TREMORS: 0
DIZZINESS: 0
POLYPHAGIA: 0
JAUNDICE: 0
NUMBNESS: 0
PARALYSIS: 0
BOWEL INCONTINENCE: 1
TROUBLE SWALLOWING: 0
DOUBLE VISION: 0
FATIGUE: 1
ALTERED TEMPERATURE REGULATION: 1
MUSCLE CRAMPS: 0
SPEECH CHANGE: 0
CHILLS: 0
EYE REDNESS: 0
EYE WATERING: 0
NECK MASS: 0
JOINT SWELLING: 0
SMELL DISTURBANCE: 0

## 2018-08-06 ENCOUNTER — OFFICE VISIT (OUTPATIENT)
Dept: NEUROLOGY | Facility: CLINIC | Age: 73
End: 2018-08-06
Payer: COMMERCIAL

## 2018-08-06 ENCOUNTER — CARE COORDINATION (OUTPATIENT)
Dept: CARE COORDINATION | Facility: CLINIC | Age: 73
End: 2018-08-06

## 2018-08-06 VITALS
HEIGHT: 68 IN | SYSTOLIC BLOOD PRESSURE: 127 MMHG | HEART RATE: 86 BPM | BODY MASS INDEX: 17.85 KG/M2 | WEIGHT: 117.8 LBS | DIASTOLIC BLOOD PRESSURE: 55 MMHG

## 2018-08-06 DIAGNOSIS — G71.3 MITOCHONDRIAL MYOPATHY: Primary | ICD-10-CM

## 2018-08-06 ASSESSMENT — PAIN SCALES - GENERAL: PAINLEVEL: NO PAIN (0)

## 2018-08-06 NOTE — PROGRESS NOTES
DEPARTMENT OF NEUROLOGY    Patient Name:  Ayla Mayers  MRN:  [id number]    :  1945  Date of Clinic Visit:  2018  Primary Care Provider:  Jennifer Spear        HPI: Ms. Mayers is a 72 year old female with history of mitochondrial myopathy, scleroderma, Raynaud's, primary biliary sclerosis, and Sicca syndrome who follows in neurology clinic for mitochondrial myopathy. Work up has included muscle biopsy in  that was consistent with mitochondrial myopathy. In addition she has complex presentation with clinical signs atypical for mitochondrial myopathy including asymmetric weakness, facial weakness, and scapular winging. Given concern for possible facioscapulohumeral dystrophy gene testing was performed at last visit but was negative for FSH.     Please see notes from encounter on 17 for more information from this patient's last clinic visit.        INTERVAL HISTORY: She reports that overall her weakness has slowly progressed over the last year. She states she particularly has difficulty with walking up stairs or going up an incline. She also reports neck weakness. She can walk a mile before getting fatigued. She denies falls, imbalance, and dysphagia.     Answers for HPI/ROS submitted by the patient on 2018   General Symptoms: Yes  Skin Symptoms: No  HENT Symptoms: Yes  EYE SYMPTOMS: Yes  HEART SYMPTOMS: No  LUNG SYMPTOMS: No  INTESTINAL SYMPTOMS: Yes  URINARY SYMPTOMS: No  GYNECOLOGIC SYMPTOMS: No  BREAST SYMPTOMS: No  SKELETAL SYMPTOMS: Yes  BLOOD SYMPTOMS: No  NERVOUS SYSTEM SYMPTOMS: Yes  MENTAL HEALTH SYMPTOMS: No  Fever: No  Loss of appetite: No  Weight loss: Yes  Weight gain: No  Fatigue: Yes  Night sweats: No  Chills: No  Increased stress: No  Excessive hunger: No  Excessive thirst: No  Feeling hot or cold when others believe the temperature is normal: Yes  Loss of height: No  Post-operative complications: No  Surgical site pain: No  Hallucinations: No  Change in or Loss  "of Energy: Yes  Hyperactivity: No  Confusion: No  Ear pain: No  Ear discharge: No  Hearing loss: No  Tinnitus: No  Nosebleeds: No  Congestion: No  Sinus pain: No  Trouble swallowing: No   Voice hoarseness: No  Mouth sores: Yes  Sore throat: No  Tooth pain: No  Gum tenderness: Yes  Bleeding gums: No  Change in taste: No  Change in sense of smell: No  Dry mouth: Yes  Hearing aid used: No  Neck lump: No  Eye pain: Yes  Vision loss: No  Dry eyes: Yes  Watery eyes: No  Eye bulging: No  Double vision: No  Flashing of lights: No  Spots: No  Floaters: No  Redness: No  Crossed eyes: No  Tunnel Vision: No  Yellowing of eyes: No  Eye irritation: No  Heart burn or indigestion: No  Nausea: No  Vomiting: No  Abdominal pain: No  Bloating: No  Constipation: No  Diarrhea: No  Blood in stool: No  Black stools: No  Rectal or Anal pain: No  Fecal incontinence: Yes  Yellowing of skin or eyes: No  Vomit with blood: No  Change in stools: No  Back pain: No  Muscle aches: No  Neck pain: No  Swollen joints: No  Joint pain: No  Bone pain: No  Muscle cramps: No  Muscle weakness: Yes  Joint stiffness: No  Bone fracture: No  Trouble with coordination: No  Dizziness or trouble with balance: No  Fainting or black-out spells: No  Memory loss: No  Headache: No  Seizures: No  Speech problems: No  Tingling: No  Tremor: No  Weakness: Yes  Difficulty walking: No  Paralysis: No  Numbness: No      Vital signs:                         Estimated body mass index is 19.13 kg/(m^2) as calculated from the following:    Height as of 4/14/16: 1.715 m (5' 7.5\").    Weight as of 4/14/16: 56.2 kg (124 lb).    Examination:  Mental Status: Fully alert, attentive. Speech fluent without errors.   Cranial Nerves: Visual fields intact. PERRL. L ptosis. Dysconjugate gaze at baseline - L eye deviated latearlly; extraocular movements are abnormal - L eye with inability to adduct and does not fully abduct. She has a left facial droop. Hearing intact to conversation. Palate " elevation symmetric, uvula midline. No dysarthria. Tongue protrusion midline.  Motor: No abnormal movements, normal tone. Neck flexion 3/5.     --RUE: 4/5 strength in deltoid, biceps, triceps, wrist extension; 3/5 finger extensors    --LUE: 4-/5 strength in deltoid, biceps, triceps, wrist extension; 3/5 finger extensors    --RLE: 3/5 hip flexors; 4+/5 knee flexors/extensors, dorsiflexion    --LLE: 4-/5 hip flexors; 4+/5 knee flexors/extensors, dorsiflexion  Reflexes: 2+ and symmetric at patella, achilles, biceps, brachioradialis  Sensory: Intact/symmetric to light touch throughout upper and lower extremities.   Coordination: FNF intact without ataxia or dysmetria.   Station/Gait: Unable to stand without using arm rests. Normal gait. Difficulty with toe and heel walking.       IINVESTIGATIONS:  All available and relevant labs, imaging, and other procedures were reviewed with the patient at this visit. Those of particular significance are listed below:      PFTs (6/12/18):  TEST  ACTUAL PRED  % PRED  FVC (L)  2.70  3.17  85  FEV1 (L) 1.93  2.47  77  FEV1/FVC (%) 71  75  95      IMPRESSION/RECOMMENDATIONS:     #Mitochondrial myopathy:  Ms. Mayers is followed in neurology clinic for mitochondrial myopathy. Exam remains stable and she does not have any new complaints today. Her presentation is atypical for mitochondrial myopathy in that she has asymmetric involvement and particularly in the face. She has been evaluated for facioscapulohumeral dystrophy but this genetic test was negative. PFTs were completed recently and are stable. Echocardiogram and EKG were also completed recently and again were stable [facility] Recommend that she continues to have echo and PFTs yearly. There is currently no medications for mitochondrial myopathy. We did discuss vitamin supplementation including with CoQ10, creatine etc. The benefit of those in mitochondrial myopathy has not been well established, but she was  advised that she could try them if she wants. We also discussed exercise and encouraged her to try to get exercise including mild to moderate cardio (e.g brisk walk) at least 3 times a week, 30-45 minutes at a time. Given that her exam is stable and she is progressing very slowly we will space out her visits to once every couple of years.    -Echocardiogram and PFTs yearly  -Mild to moderate exercise 3 times per week      RETURN TO CLINIC: 2 years      Patient has been seen with Dr. Pierre who agrees with my assessment and plan    Mat Yanes MD  Neurology PGY3    ATTENDING ADDENDUM: Patient seen and examined with resident Dr Yanes today. Agree with his assessment and recommendations as above. TT spent for patient care 15 minutes; more than half was counseling. Suresh Khan MD

## 2018-08-06 NOTE — MR AVS SNAPSHOT
"              After Visit Summary   8/6/2018    Ayla Mayers    MRN: [id number]           Patient Information     Date Of Birth          1945        Visit Information        Provider Department      8/6/2018 11:50 AM Suresh Khan MD [facility] Neurology        Today's Diagnoses     Mitochondrial myopathy    -  1       Follow-ups after your visit        Follow-up notes from your care team     Return in about 2 years (around 8/6/2020).      Future tests that were ordered for you today     Open Future Orders        Priority Expected Expires Ordered    Pulmonary Function Test Routine 8/9/2018 8/3/2019 8/3/2018            Who to contact     Please call your clinic at [phone] to:    Ask questions about your health    Make or cancel appointments    Discuss your medicines    Learn about your test results    Speak to your doctor            Additional Information About Your Visit        MyChart Information     [employer] gives you secure access to your electronic health record. If you see a primary care provider, you can also send messages to your care team and make appointments. If you have questions, please call your primary care clinic.  If you do not have a primary care provider, please call [phone] and they will assist you.      [employer] is an electronic gateway that provides easy, online access to your medical records. With [employer], you can request a clinic appointment, read your test results, renew a prescription or communicate with your care team.     To access your existing account, please contact your [facility] or call [phone] for assistance.        Care EveryWhere ID     This is your Care EveryWhere ID. This could be used by other organizations to access your [facility] medical records  [id number]        Your Vitals Were     Pulse Height BMI (Body Mass Index)             86 1.715 m (5' 7.5\") 18.18 kg/m2          Blood Pressure from Last 3 " Encounters:   08/06/18 127/55   08/07/17 154/61   04/14/16 (!) 158/92    Weight from Last 3 Encounters:   08/06/18 53.4 kg (117 lb 12.8 oz)   04/14/16 56.2 kg (124 lb)   11/09/15 58.1 kg (128 lb)               Primary Care Provider Office Phone # Fax #    Jennifer Spear [phone]       [facility] [address] [place] [address]        Equal Access to Services     [facility]: Hadii aad ku hadasho Soomaali, waaxda luqadaha, qaybta kaalmada adeegyada, judit vasquez . So [facility] [phone].    ATENCIÓN: Si habla español, tiene a thomas disposición servicios gratuitos de asistencia lingüística. [facility] [phone].    We comply with applicable federal civil rights laws and [place] laws. We do not discriminate on the basis of race, color, national origin, age, disability, sex, sexual orientation, or gender identity.            Thank you!     Thank you for choosing [facility] NEUROLOGY  for your care. Our goal is always to provide you with excellent care. Hearing back from our patients is one way we can continue to improve our services. Please take a few minutes to complete the written survey that you may receive in the mail after your visit with us. Thank you!             Your Updated Medication List - Protect others around you: Learn how to safely use, store and throw away your medicines at www.disposemymeds.org.          This list is accurate as of 8/6/18  1:13 PM.  Always use your most recent med list.                   Brand Name Dispense Instructions for use Diagnosis    cholecalciferol 2000 units tablet      Take 1 tablet by mouth daily        IRON SUPPLEMENT PO      Take 325 mg by mouth        levothyroxine 100 MCG tablet    SYNTHROID/LEVOTHROID     Take 1 tablet by mouth daily 100 mcg every other day, 112 the other days        NADOLOL PO      daily        OMEGA-3 FISH OIL PO      Take 1 g by mouth        OMEPRAZOLE PO      Take 20 mg by mouth  every morning        PROLIA 60 MG/ML Soln injection   Generic drug:  denosumab      EVERY 6 MONTHS        ursodiol 300 MG capsule    ACTIGALL     300 mg 3 times daily

## 2018-08-06 NOTE — LETTER
"2018       RE: Ayla Mayers  [address]  [place] [facility] [address]     Dear Colleague,    Thank you for referring your patient, Ayla Mayers, to the [facility] NEUROLOGY at [facility]. Please see a copy of my visit note below.      DEPARTMENT OF NEUROLOGY    Patient Name:  Ayla Mayers  MRN:  [id number]    :  1945  Date of Clinic Visit:  2018  Primary Care Provider:  Jennifer Spear        HPI: Ms. Mayers is a 72 year old female with history of mitochondrial myopathy, scleroderma, Raynaud's, primary biliary sclerosis, and Sicca syndrome who follows in neurology clinic for mitochondrial myopathy. Work up has included muscle biopsy in  that was consistent with mitochondrial myopathy. In addition she has complex presentation with clinical signs atypical for mitochondrial myopathy including asymmetric weakness, facial weakness, and scapular winging. Given concern for possible facioscapulohumeral dystrophy gene testing was performed at last visit but was negative for FSH.     Please see notes from encounter on 17 for more information from this patient's last clinic visit.        INTERVAL HISTORY: She reports that overall her weakness has slowly progressed over the last year. She states she particularly has difficulty with walking up stairs or going up an incline. She also reports neck weakness. She can walk a mile before getting fatigued. She denies falls, imbalance, and dysphagia.           Vital signs:                         Estimated body mass index is 19.13 kg/(m^2) as calculated from the following:    Height as of 16: 1.715 m (5' 7.5\").    Weight as of 16: 56.2 kg (124 lb).    Examination:  Mental Status: Fully alert, attentive. Speech fluent without errors.   Cranial Nerves: Visual fields intact. PERRL. L ptosis. Dysconjugate gaze at baseline - L eye deviated latearlly; extraocular movements are " abnormal - L eye with inability to adduct and does not fully abduct. She has a left facial droop. Hearing intact to conversation. Palate elevation symmetric, uvula midline. No dysarthria. Tongue protrusion midline.  Motor: No abnormal movements, normal tone. Neck flexion 3/5.     --RUE: 4/5 strength in deltoid, biceps, triceps, wrist extension; 3/5 finger extensors    --LUE: 4-/5 strength in deltoid, biceps, triceps, wrist extension; 3/5 finger extensors    --RLE: 3/5 hip flexors; 4+/5 knee flexors/extensors, dorsiflexion    --LLE: 4-/5 hip flexors; 4+/5 knee flexors/extensors, dorsiflexion  Reflexes: 2+ and symmetric at patella, achilles, biceps, brachioradialis  Sensory: Intact/symmetric to light touch throughout upper and lower extremities.   Coordination: FNF intact without ataxia or dysmetria.   Station/Gait: Unable to stand without using arm rests. Normal gait. Difficulty with toe and heel walking.       IINVESTIGATIONS:  All available and relevant labs, imaging, and other procedures were reviewed with the patient at this visit. Those of particular significance are listed below:      PFTs (6/12/18):  TEST  ACTUAL PRED  % PRED  FVC (L)  2.70  3.17  85  FEV1 (L) 1.93  2.47  77  FEV1/FVC (%) 71  75  95      IMPRESSION/RECOMMENDATIONS:     #Mitochondrial myopathy:  Ms. Mayers is followed in neurology clinic for mitochondrial myopathy. Exam remains stable and she does not have any new complaints today. Her presentation is atypical for mitochondrial myopathy in that she has asymmetric involvement and particularly in the face. She has been evaluated for facioscapulohumeral dystrophy but this genetic test was negative. PFTs were completed recently and are stable. Echocardiogram and EKG were also completed recently and again were [facility] Recommend that she continues to have echo and PFTs yearly. There is currently no medications for mitochondrial myopathy. We did discuss vitamin  supplementation including with CoQ10, creatine etc. The benefit of those in mitochondrial myopathy has not been well established, but she was advised that she could try them if she wants. We also discussed exercise and encouraged her to try to get exercise including mild to moderate cardio (e.g brisk walk) at least 3 times a week, 30-45 minutes at a time. Given that her exam is stable and she is progressing very slowly we will space out her visits to once every couple of years.    -Echocardiogram and PFTs yearly  -Mild to moderate exercise 3 times per week      RETURN TO CLINIC: 2 years      Patient has been seen with Dr. Pierre who agrees with my assessment and plan    Mat Yanes MD  Neurology PGY3    ATTENDING ADDENDUM: Patient seen and examined with resident Dr Yanes today. Agree with his assessment and recommendations as above. TT spent for patient care 15 minutes; more than half was counseling. Suresh Khan MD

## 2019-10-04 ENCOUNTER — HEALTH MAINTENANCE LETTER (OUTPATIENT)
Age: 74
End: 2019-10-04

## 2020-02-05 ENCOUNTER — TELEPHONE (OUTPATIENT)
Dept: RHEUMATOLOGY | Facility: CLINIC | Age: 75
End: 2020-02-05

## 2020-02-05 NOTE — TELEPHONE ENCOUNTER
[facility] Call Center    Phone Message    May a detailed message be left on voicemail: yes     Reason for Call: Other: Pt calling to request an appt with Dr. Foy; she is requesting to f/u regarding her scleroderma. She was last seen in 2016, and notes she has seen a [facility] provider since this time. Per guidelines, pts are considered current if seen in the last 3 years. Please advise if okay to schedule.    Action Taken: Message routed to:  [facility] Rheum    Travel Screening: Not Applicable

## 2020-02-10 ENCOUNTER — HEALTH MAINTENANCE LETTER (OUTPATIENT)
Age: 75
End: 2020-02-10

## 2020-02-24 NOTE — TELEPHONE ENCOUNTER
2/20/2020 Scleroderma Intake Form:    Scleroderma/Myositis Patient Intake Form    Referring Provider: Self, previously saw Dr oFy in 2016    Primary Care Provider: Dr Jennifer Spear   Location: [facility]    A. Have you been told you have Scleroderma? YES (Limited Scleroderma)  (Sjogrens,  Primary biliary cirrhosis, osteoporosis, and biopsy proven mitochondrial myopathy)     B. When were you diagnosed (month/year)? 2003  1. Date when Raynaud's began (month/year)? 6/2001  2. Date when skin swelling began (month/year) 2000    C. Doctor who diagnosed you:    Name: Dr Jose Eduardo Harding   Address/Clinic: [facility]    Specialty: Rheumatology     BETH  YES, in 2015: >1:640 in [employer] pattern    D. Have you been in the hospital due to scleroderma? No    E. Have you seen a Rheumatologist in the past? YES   List Rheumatologist/ Location: Jose Eduardo Karimi/[facility]    F. Who currently manages your care for this issue now? Dr Karimi    Do you currently have or have you had in the past the following symptoms:    1. Hands cold or purple with cold weather: YES    2.  Puffy fingers: YES    3.  Non-healing wounds on fingertips or knuckles: No    4.  Current wounds to fingers: No    5.  Skin thickening: (when you pinch your skin does it make a tent) YES  A. Skin thickening of fingers: YES  B. Skin thickening from the wrist to the elbow: No  C. Skin thickening of the face or feet: No  D. Skin thickening of upper arms, trunk, thighs: YES- Ankles    6. Joint swelling and/or stiffness: No   A. Which joints:     7. Muscle pain/ weakness: YES   A. Which muscles: Thigh (left) ? mitochondrial myopathy, eye muscles   B. Describe the pain:     8. Rash: No   A. Describe    B. Location     9.  Diarrhea: No   A. Previous diagnosis of SBO?   B. Antibiotics for diarrhea:    1. Name of antibiotic:    10: Unintentional weight loss: YES   A. Amount of wt lost: in 2015 pt lost 15-20 lbs. Wt is stable now    11. Bowel obstruction and  "you had to go to the hospital: No   A. Name of Hospital:     12. Nutrients/vitamins through an IV: No    13. Heartburn or food/stomach acid comes up: YES   A. Medications taken for this: Omeprazole 20 mg twice daily   B. Have a diagnosis of Gave?     14. Food or pills get stuck when you swallow: YES- sometimes   A. Have diagnosis of esophageal dysmotility? No    15. Chronic cough: No    16. Inflamed lining around lung (pleurisy): No    17. Shortness of breath needing home oxygen: No   A. What setting is your O2?     18. Shortness of breath or fatigue with normal activity: YES    19. Shortness of breath at rest: No    20. Known Heart problems: No   A. Please describe:     21. Inflamed lining around heart (pericarditis): No    22. Abnormal heart rhythms (arrhythmias): YES   A. If yes, what: \"had an irregular heart beat for a long time, was told that it isn't something to be concerned about.\"    23. Other heart problems that were not mentioned: No   A. PAH/PH: No   B. Diastolic dysfunction: No    24. High blood pressure: No  A. High blood pressure before scleroderma: No    24. History of dialysis: No    25. History of blood clots: No    26. History of miscarriages when more than 2 months pregnant: No   A. If yes, how far along before miscarriage?     27.  Have you ever been diagnosed with any other autoimmune diseases? (i.e.rheumatoid arthritis, lupus, sjogren's) Sjogren's:     28.  Loss of balance: No    29.  Migraine headaches: No    30. Seizures or strokes: No    31. Nerve problem's in face (Bell's Palsy): No    32. Numbness of hands, or been told you have carpal tunnel syndrome: No   A. If so when did you start to develop the numbness?     33. Dry eyes: YES  A. Need to use artificial tears: YES  B. Gritty or catracho feeling in eyes: No  C. Dry mouth: YES      34. Do you smoke? No but did smoke in her college years. 1-2 cigarettes/day for 4 years     35. How much alcohol do you drink per week? Yes, 1 " drink/month    36. Do you use any stimulant drugs (i.e. Pseudoephedrine) No    37. Recreational drugs (i.e. Crystal meth, cocaine) No    38. Is there any family history or diagnosis of autoimmune disease? No    Have you ever had any of the following tests? Where and when?    1. Upper endoscopy:  YES - grade II Varicies in lower third of esophagus   Date/Where: 11/15/19 at [facility]    2. Chest x-ray: YES  Date/Where: 2017 at [facility]    3. High resolution CT scan: YES  Date/Where: 11/9/18 at [facility]    4. Pulmonary function test:  YES  Date/Where: 4/29/19    5. EKG: YES- 4/30/19 at [facility]    6. Right heart Cath: No  Date/Where:     7. ECHO cardiogram:  YES  Date/Where: 4/24/19 at [facility]    8. Barium swallow: No    Have you ever seen the following specialists: Where and when?    Pulmonologist: YES-Pending rtn appointment on 4/22/2020   Name/Location: Dr Jennifer Cm/[facility]    Gastroenterologist: YES-pending rtn appointment 3/10/2020   Name/Location: Dr Kate Beasley/ [facility]    Cardiologist: No   Name/Location: Only does procedures through Cardiology at [facility]    Dermatologist: YES   Name/Location: 12/11/14, Dr Ashely Lopez at [facility]      6. Are there any other symptoms or concerns I haven't mentioned that you would like to discuss with the doctor? Pt believes her symptoms are progressing, telangiectasias are worsening, as are the digestive symptoms. Pt is unsure if she would like to establish care for management or assistance with co-management with Dr Karimi. Will decide at her appointment.    Pt is currently in [place] and is not available to be see until after April 5th. Appointment offered and scheduled on 4/16th at 3:30 pm. Pt is agreeable to this date and time. Verifies understanding on location of clinic. Staff message sent to the pool asking for assistance in scheduling this appointment.    VISHNU BeltranN RN  Scleroderma Care  Coordinator  [facility]

## 2020-02-27 NOTE — TELEPHONE ENCOUNTER
Action    Action Taken 02.27.2020 Called [facility] to get PFT pushed over and or report, called [phone], was done at [facility] [phone] Opt 4 was told to send over fax request. Sent fax to [phone].

## 2020-11-14 ENCOUNTER — HEALTH MAINTENANCE LETTER (OUTPATIENT)
Age: 75
End: 2020-11-14

## 2021-03-28 ENCOUNTER — HEALTH MAINTENANCE LETTER (OUTPATIENT)
Age: 76
End: 2021-03-28

## 2021-09-12 ENCOUNTER — HEALTH MAINTENANCE LETTER (OUTPATIENT)
Age: 76
End: 2021-09-12

## 2022-04-24 ENCOUNTER — HEALTH MAINTENANCE LETTER (OUTPATIENT)
Age: 77
End: 2022-04-24

## 2022-11-19 ENCOUNTER — HEALTH MAINTENANCE LETTER (OUTPATIENT)
Age: 77
End: 2022-11-19

## 2023-06-01 ENCOUNTER — HEALTH MAINTENANCE LETTER (OUTPATIENT)
Age: 78
End: 2023-06-01